# Patient Record
Sex: FEMALE | Race: WHITE | Employment: FULL TIME | URBAN - METROPOLITAN AREA
[De-identification: names, ages, dates, MRNs, and addresses within clinical notes are randomized per-mention and may not be internally consistent; named-entity substitution may affect disease eponyms.]

---

## 2018-05-07 ENCOUNTER — HOSPITAL ENCOUNTER (OUTPATIENT)
Dept: OTHER | Age: 28
Discharge: HOME | End: 2018-05-07
Attending: NURSE PRACTITIONER | Admitting: NURSE PRACTITIONER

## 2018-05-07 DIAGNOSIS — M54.5 LOW BACK PAIN, UNSPECIFIED BACK PAIN LATERALITY, UNSPECIFIED CHRONICITY, WITH SCIATICA PRESENCE UNSPECIFIED: ICD-10-CM

## 2018-08-13 ENCOUNTER — HOSPITAL ENCOUNTER (OUTPATIENT)
Dept: WOMENS IMAGING | Age: 28
Discharge: HOME | End: 2018-08-13
Payer: COMMERCIAL

## 2018-08-13 DIAGNOSIS — N64.89 BREAST ASYMMETRY: ICD-10-CM

## 2018-08-13 PROCEDURE — G0279 TOMOSYNTHESIS, MAMMO: HCPCS

## 2018-08-21 ENCOUNTER — HOSPITAL ENCOUNTER (OUTPATIENT)
Age: 28
Setting detail: OUTPATIENT SURGERY
Discharge: HOME | End: 2018-08-21
Attending: INTERNAL MEDICINE | Admitting: INTERNAL MEDICINE
Payer: COMMERCIAL

## 2018-08-21 VITALS
OXYGEN SATURATION: 98 % | SYSTOLIC BLOOD PRESSURE: 95 MMHG | DIASTOLIC BLOOD PRESSURE: 46 MMHG | BODY MASS INDEX: 28.32 KG/M2 | HEIGHT: 65 IN | TEMPERATURE: 98.1 F | WEIGHT: 170 LBS | HEART RATE: 68 BPM | RESPIRATION RATE: 18 BRPM

## 2018-08-21 LAB — PREGNANCY, URINE: NEGATIVE

## 2018-08-21 PROCEDURE — 2709999900 HC NON-CHARGEABLE SUPPLY: Performed by: INTERNAL MEDICINE

## 2018-08-21 PROCEDURE — 3609027000 HC COLONOSCOPY: Performed by: INTERNAL MEDICINE

## 2018-08-21 PROCEDURE — 99153 MOD SED SAME PHYS/QHP EA: CPT | Performed by: INTERNAL MEDICINE

## 2018-08-21 PROCEDURE — 84703 CHORIONIC GONADOTROPIN ASSAY: CPT

## 2018-08-21 PROCEDURE — 7100000010 HC PHASE II RECOVERY - FIRST 15 MIN: Performed by: INTERNAL MEDICINE

## 2018-08-21 PROCEDURE — 6360000002 HC RX W HCPCS: Performed by: INTERNAL MEDICINE

## 2018-08-21 PROCEDURE — 2580000003 HC RX 258: Performed by: INTERNAL MEDICINE

## 2018-08-21 PROCEDURE — 7100000011 HC PHASE II RECOVERY - ADDTL 15 MIN: Performed by: INTERNAL MEDICINE

## 2018-08-21 PROCEDURE — 99152 MOD SED SAME PHYS/QHP 5/>YRS: CPT | Performed by: INTERNAL MEDICINE

## 2018-08-21 RX ORDER — MIDAZOLAM HYDROCHLORIDE 5 MG/ML
INJECTION INTRAMUSCULAR; INTRAVENOUS PRN
Status: DISCONTINUED | OUTPATIENT
Start: 2018-08-21 | End: 2018-08-21 | Stop reason: HOSPADM

## 2018-08-21 RX ORDER — SODIUM CHLORIDE, SODIUM LACTATE, POTASSIUM CHLORIDE, CALCIUM CHLORIDE 600; 310; 30; 20 MG/100ML; MG/100ML; MG/100ML; MG/100ML
INJECTION, SOLUTION INTRAVENOUS CONTINUOUS
Status: DISCONTINUED | OUTPATIENT
Start: 2018-08-21 | End: 2018-08-21 | Stop reason: HOSPADM

## 2018-08-21 RX ORDER — LIDOCAINE HYDROCHLORIDE 10 MG/ML
0.1 INJECTION, SOLUTION EPIDURAL; INFILTRATION; INTRACAUDAL; PERINEURAL ONCE
Status: DISCONTINUED | OUTPATIENT
Start: 2018-08-21 | End: 2018-08-21 | Stop reason: HOSPADM

## 2018-08-21 RX ORDER — FENTANYL CITRATE 50 UG/ML
INJECTION, SOLUTION INTRAMUSCULAR; INTRAVENOUS PRN
Status: DISCONTINUED | OUTPATIENT
Start: 2018-08-21 | End: 2018-08-21 | Stop reason: HOSPADM

## 2018-08-21 RX ADMIN — SODIUM CHLORIDE, POTASSIUM CHLORIDE, SODIUM LACTATE AND CALCIUM CHLORIDE: 600; 310; 30; 20 INJECTION, SOLUTION INTRAVENOUS at 13:21

## 2018-08-21 ASSESSMENT — PAIN SCALES - GENERAL
PAINLEVEL_OUTOF10: 0

## 2018-08-21 ASSESSMENT — PAIN - FUNCTIONAL ASSESSMENT: PAIN_FUNCTIONAL_ASSESSMENT: 0-10

## 2018-08-21 NOTE — H&P
History and Physical / Pre-Sedation Assessment    Patient:  Kuldip Maldonado   :   1990     Intended Procedure: colon     HPI: constipation    Nurses notes reviewed and agreed. Medications reviewed  Allergies: No Known Allergies    Physical Exam:  Vital Signs: /62   Pulse 71   Temp 98.1 °F (36.7 °C) (Temporal)   Resp 16   Ht 5' 5\" (1.651 m)   Wt 170 lb (77.1 kg)   LMP 2018   SpO2 98%   BMI 28.29 kg/m²  Body mass index is 28.29 kg/m². Airway:Normal  Pulmonary:Normal  Cardiac:Normal  Abdomen:Normal    Pre-Procedure Assessment / Plan:  ASA 2 - Patient with mild systemic disease with no functional limitations  Level of Sedation Plan: Moderate  sedation  Post Procedure plan: Return to same level of care    I assessed the patient and find that the patient is in satisfactory condition to proceed with the planned procedure and sedation plan. I have explained the risk, benefits, and alternatives to the procedure. The patient understands and agrees to proceed.   Yes    Patti Ortiz  1:50 PM 2018

## 2018-08-22 NOTE — OP NOTE
[facility], [place]                   JonathanJaspreet lea                                  OPERATIVE REPORT    PATIENT NAME: Curry Mascorro                   :        1990  MED REC NO:   [id number]                          ROOM:  ACCOUNT NO:   [de-identified]                           ADMIT DATE: 2018  PROVIDER:     Rodrigo Schwartz MD    DATE OF PROCEDURE:  2018    PREPROCEDURE DIAGNOSIS:  Constipation. POSTPROCEDURE DIAGNOSIS:  Normal.    INDICATIONS:  The patient is a 64-year-old female who has severe  constipation, states she can have a bowel movement twice a month, has not  responded to conservative over-the-counter measures. Consent was obtained. OPERATIVE PROCEDURE:  The patient was sedated with 100 mcg of fentanyl, 8  mg of Versed IV. She underwent continuous blood pressure, oximetry and  cardiac monitoring. The Olympus video colonoscope was inserted, passed to  the tip of the cecum. Cecal landmarks were identified and photographed. Mucosa was examined in a circular fashion upon withdrawal of the scope. Prep was good. Cecum, ascending, transverse, descending and sigmoid colons  were all normal without evidence of inflammation, ulceration or mass  lesion. Rectum was visualized both on antegrade and retrograde views, was  normal.  She tolerated the procedure well. IMPRESSION:  Normal colonoscopy. She has been instructed on a high-fiber  diet. We will see her in the office and try one of the new medications for  constipation, i.e., either Linzess or Trulance.         James Salazar MD    D: 2018 14:20:41       T: 2018 19:46:30     [id number]  Job#: [id number]     Doc#: [id number]    CC:  Miracle Goodrich NP

## 2021-08-22 ENCOUNTER — APPOINTMENT (OUTPATIENT)
Dept: CT IMAGING | Age: 31
End: 2021-08-22
Payer: COMMERCIAL

## 2021-08-22 ENCOUNTER — APPOINTMENT (OUTPATIENT)
Dept: GENERAL RADIOLOGY | Age: 31
End: 2021-08-22
Payer: COMMERCIAL

## 2021-08-22 ENCOUNTER — HOSPITAL ENCOUNTER (EMERGENCY)
Age: 31
Discharge: HOME | End: 2021-08-22
Attending: EMERGENCY MEDICINE
Payer: COMMERCIAL

## 2021-08-22 VITALS
BODY MASS INDEX: 26.66 KG/M2 | WEIGHT: 160 LBS | HEART RATE: 90 BPM | RESPIRATION RATE: 16 BRPM | DIASTOLIC BLOOD PRESSURE: 64 MMHG | HEIGHT: 65 IN | SYSTOLIC BLOOD PRESSURE: 101 MMHG | OXYGEN SATURATION: 100 % | TEMPERATURE: 98.5 F

## 2021-08-22 DIAGNOSIS — K59.00 CONSTIPATION, UNSPECIFIED CONSTIPATION TYPE: ICD-10-CM

## 2021-08-22 DIAGNOSIS — U07.1 COVID-19: Primary | ICD-10-CM

## 2021-08-22 DIAGNOSIS — N30.01 ACUTE CYSTITIS WITH HEMATURIA: ICD-10-CM

## 2021-08-22 LAB
A/G RATIO: 1.1 (ref 1.1–2.2)
ALBUMIN SERPL-MCNC: 4.2 G/DL (ref 3.4–5)
ALP BLD-CCNC: 79 U/L (ref 40–129)
ALT SERPL-CCNC: 51 U/L (ref 10–40)
ANION GAP SERPL CALCULATED.3IONS-SCNC: 11 MMOL/L (ref 3–16)
AST SERPL-CCNC: 54 U/L (ref 15–37)
BACTERIA: ABNORMAL /HPF
BASOPHILS ABSOLUTE: 0 K/UL (ref 0–0.2)
BASOPHILS RELATIVE PERCENT: 0.3 %
BILIRUB SERPL-MCNC: 0.4 MG/DL (ref 0–1)
BILIRUBIN URINE: NEGATIVE
BLOOD, URINE: ABNORMAL
BUN BLDV-MCNC: 6 MG/DL (ref 7–20)
CALCIUM SERPL-MCNC: 9.4 MG/DL (ref 8.3–10.6)
CHLORIDE BLD-SCNC: 99 MMOL/L (ref 99–110)
CLARITY: CLEAR
CO2: 26 MMOL/L (ref 21–32)
COLOR: YELLOW
CREAT SERPL-MCNC: 0.7 MG/DL (ref 0.6–1.1)
D DIMER: <200 NG/ML DDU (ref 0–229)
EOSINOPHILS ABSOLUTE: 0 K/UL (ref 0–0.6)
EOSINOPHILS RELATIVE PERCENT: 0.1 %
EPITHELIAL CELLS, UA: ABNORMAL /HPF (ref 0–5)
GFR AFRICAN AMERICAN: >60
GFR NON-AFRICAN AMERICAN: >60
GLOBULIN: 3.8 G/DL
GLUCOSE BLD-MCNC: 94 MG/DL (ref 70–99)
GLUCOSE URINE: NEGATIVE MG/DL
HCG QUALITATIVE: NEGATIVE
HCT VFR BLD CALC: 37.9 % (ref 36–48)
HEMOGLOBIN: 13.2 G/DL (ref 12–16)
KETONES, URINE: 15 MG/DL
LEUKOCYTE ESTERASE, URINE: ABNORMAL
LIPASE: 28 U/L (ref 13–60)
LYMPHOCYTES ABSOLUTE: 1 K/UL (ref 1–5.1)
LYMPHOCYTES RELATIVE PERCENT: 31.2 %
MCH RBC QN AUTO: 30.7 PG (ref 26–34)
MCHC RBC AUTO-ENTMCNC: 34.9 G/DL (ref 31–36)
MCV RBC AUTO: 87.9 FL (ref 80–100)
MICROSCOPIC EXAMINATION: YES
MONOCYTES ABSOLUTE: 0.5 K/UL (ref 0–1.3)
MONOCYTES RELATIVE PERCENT: 14.5 %
NEUTROPHILS ABSOLUTE: 1.8 K/UL (ref 1.7–7.7)
NEUTROPHILS RELATIVE PERCENT: 53.9 %
NITRITE, URINE: NEGATIVE
PDW BLD-RTO: 13.3 % (ref 12.4–15.4)
PH UA: 5.5 (ref 5–8)
PLATELET # BLD: 160 K/UL (ref 135–450)
PMV BLD AUTO: 9.2 FL (ref 5–10.5)
POTASSIUM REFLEX MAGNESIUM: 3.6 MMOL/L (ref 3.5–5.1)
PROTEIN UA: NEGATIVE MG/DL
RBC # BLD: 4.32 M/UL (ref 4–5.2)
RBC UA: ABNORMAL /HPF (ref 0–4)
SARS-COV-2, NAAT: DETECTED
SODIUM BLD-SCNC: 136 MMOL/L (ref 136–145)
SPECIFIC GRAVITY UA: <=1.005 (ref 1–1.03)
TOTAL PROTEIN: 8 G/DL (ref 6.4–8.2)
URINE TYPE: ABNORMAL
UROBILINOGEN, URINE: 0.2 E.U./DL
WBC # BLD: 3.3 K/UL (ref 4–11)
WBC UA: ABNORMAL /HPF (ref 0–5)

## 2021-08-22 PROCEDURE — 6360000002 HC RX W HCPCS: Performed by: EMERGENCY MEDICINE

## 2021-08-22 PROCEDURE — 96374 THER/PROPH/DIAG INJ IV PUSH: CPT

## 2021-08-22 PROCEDURE — 85379 FIBRIN DEGRADATION QUANT: CPT

## 2021-08-22 PROCEDURE — 6360000004 HC RX CONTRAST MEDICATION: Performed by: EMERGENCY MEDICINE

## 2021-08-22 PROCEDURE — 83690 ASSAY OF LIPASE: CPT

## 2021-08-22 PROCEDURE — 96375 TX/PRO/DX INJ NEW DRUG ADDON: CPT

## 2021-08-22 PROCEDURE — 94640 AIRWAY INHALATION TREATMENT: CPT

## 2021-08-22 PROCEDURE — 93005 ELECTROCARDIOGRAM TRACING: CPT | Performed by: EMERGENCY MEDICINE

## 2021-08-22 PROCEDURE — 80053 COMPREHEN METABOLIC PANEL: CPT

## 2021-08-22 PROCEDURE — 84703 CHORIONIC GONADOTROPIN ASSAY: CPT

## 2021-08-22 PROCEDURE — 87086 URINE CULTURE/COLONY COUNT: CPT

## 2021-08-22 PROCEDURE — 6370000000 HC RX 637 (ALT 250 FOR IP): Performed by: EMERGENCY MEDICINE

## 2021-08-22 PROCEDURE — 74177 CT ABD & PELVIS W/CONTRAST: CPT

## 2021-08-22 PROCEDURE — 2580000003 HC RX 258: Performed by: PHYSICIAN ASSISTANT

## 2021-08-22 PROCEDURE — 81001 URINALYSIS AUTO W/SCOPE: CPT

## 2021-08-22 PROCEDURE — 85025 COMPLETE CBC W/AUTO DIFF WBC: CPT

## 2021-08-22 PROCEDURE — 99284 EMERGENCY DEPT VISIT MOD MDM: CPT

## 2021-08-22 PROCEDURE — 87635 SARS-COV-2 COVID-19 AMP PRB: CPT

## 2021-08-22 PROCEDURE — 36415 COLL VENOUS BLD VENIPUNCTURE: CPT

## 2021-08-22 PROCEDURE — 71045 X-RAY EXAM CHEST 1 VIEW: CPT

## 2021-08-22 RX ORDER — NITROFURANTOIN 25; 75 MG/1; MG/1
100 CAPSULE ORAL 2 TIMES DAILY
Qty: 10 CAPSULE | Refills: 0 | Status: SHIPPED | OUTPATIENT
Start: 2021-08-22 | End: 2021-08-27

## 2021-08-22 RX ORDER — ALBUTEROL SULFATE 90 UG/1
2 AEROSOL, METERED RESPIRATORY (INHALATION) ONCE
Status: COMPLETED | OUTPATIENT
Start: 2021-08-22 | End: 2021-08-22

## 2021-08-22 RX ORDER — 0.9 % SODIUM CHLORIDE 0.9 %
1000 INTRAVENOUS SOLUTION INTRAVENOUS ONCE
Status: COMPLETED | OUTPATIENT
Start: 2021-08-22 | End: 2021-08-22

## 2021-08-22 RX ORDER — METOCLOPRAMIDE HYDROCHLORIDE 5 MG/ML
10 INJECTION INTRAMUSCULAR; INTRAVENOUS ONCE
Status: COMPLETED | OUTPATIENT
Start: 2021-08-22 | End: 2021-08-22

## 2021-08-22 RX ORDER — DIPHENHYDRAMINE HYDROCHLORIDE 50 MG/ML
12.5 INJECTION INTRAMUSCULAR; INTRAVENOUS ONCE
Status: COMPLETED | OUTPATIENT
Start: 2021-08-22 | End: 2021-08-22

## 2021-08-22 RX ADMIN — DIPHENHYDRAMINE HYDROCHLORIDE 12.5 MG: 50 INJECTION, SOLUTION INTRAMUSCULAR; INTRAVENOUS at 17:04

## 2021-08-22 RX ADMIN — SODIUM CHLORIDE 1000 ML: 9 INJECTION, SOLUTION INTRAVENOUS at 15:59

## 2021-08-22 RX ADMIN — METOCLOPRAMIDE 10 MG: 5 INJECTION, SOLUTION INTRAMUSCULAR; INTRAVENOUS at 17:05

## 2021-08-22 RX ADMIN — IOPAMIDOL 75 ML: 755 INJECTION, SOLUTION INTRAVENOUS at 19:04

## 2021-08-22 RX ADMIN — Medication 2 PUFF: at 19:45

## 2021-08-22 ASSESSMENT — ENCOUNTER SYMPTOMS
SHORTNESS OF BREATH: 1
VOMITING: 1
CHEST TIGHTNESS: 0
NAUSEA: 1
DIARRHEA: 0
ABDOMINAL PAIN: 1
CONSTIPATION: 1
COUGH: 1
BACK PAIN: 0

## 2021-08-22 ASSESSMENT — PAIN SCALES - GENERAL: PAINLEVEL_OUTOF10: 10

## 2021-08-22 ASSESSMENT — PAIN DESCRIPTION - PAIN TYPE: TYPE: ACUTE PAIN

## 2021-08-22 ASSESSMENT — PAIN DESCRIPTION - LOCATION: LOCATION: ABDOMEN

## 2021-08-22 NOTE — ED PROVIDER NOTES
I independently performed a history and physical on Aziza. All diagnostic, treatment, and disposition decisions were made by myself in conjunction with the advanced practice provider. For further details of Roula Echavarria emergency department encounter, please see VIDHYA Dejesus's documentation. Patient is a 78-year-old female who just got back from [place] and ultimately started developing over the last few days fever along with cough and some abdominal discomfort. She reports having no bowel movement in the last 3 weeks although normally only goes seldomly but this is worse than normal.  She denies any vomiting. No current chest pain. She does have a history of smoking but quit. Due to concerning symptoms, she came to the ED for further assessment. Physical:   Gen: No acute distress. AOx3. Psych: Normal mood and affect  HEENT: NCAT  Neck: supple, normal ROM, NTTP, no nuchal rigidity   Cardiac: RRR, pulses 2+ in upper extremities  Lungs: C2AB, no R/R/W  Abdomen: soft and nontender with no R/D/G  Neuro: no focal neuro deficits with strength and sensation 5/5 in all 4 extremities    The Ekg interpreted by me shows  normal sinus rhythm with a rate of 87  Axis is   Normal  QTc is  normal  Intervals and Durations are unremarkable. ST Segments: normal  No significant change from prior EKG dated - no old EKG  No STEMI       MDM: Patient was evaluated due to worsening cough with fever over the last couple of days but also due to concern for not having a bowel movement for reportedly 3 weeks. She had a very benign abdominal exam but based on her story, we did order a CT which was negative for any bowel obstruction or significant pathology. It did mention appearance of Covid like pneumonia and her Covid test to come back positive. Her oxygenation was normal in the ED. She had no respiratory distress or concerns at this point.   She is aware to isolate for 10 days from onset of symptoms and make sure symptoms are improving. She was not vaccinated. She is aware that if she has any worsening fever associated with chest pain along with shortness of breath, vomiting, severe abdominal pain, then return to the ED for repeat assessment, even if that means over the next 12 hours, but otherwise follow-up with primary doctor over the next few days. She was well-appearing and in no acute distress at time of discharge and felt comfortable with this plan. Urinalysis showed possible UTI and therefore she was started on Macrobid. We gave her magnesium citrate to see if this can help her have a bowel movement at home. D-dimer was negative and story not suggestive of pulmonary embolism.      Ky Henry MD  08/23/21 [id number]

## 2021-08-22 NOTE — ED PROVIDER NOTES
**ADVANCED PRACTICE PROVIDER, I HAVE EVALUATED THIS [facility]  ED  EMERGENCY DEPARTMENT ENCOUNTER      Pt Name: Kendra Patterson  LSN:[id number]  Birthdate 1990  Date of evaluation: 8/22/2021  Provider: VIDHYA Fontenot      Chief Complaint:    Chief Complaint   Patient presents with    Fever     Fever, cough, emesis since Wednesday    Cough    Abdominal Pain     Reports no BM x3 weeks. Nursing Notes, Past Medical Hx, Past Surgical Hx, Social Hx, Allergies, and Family Hx were all reviewed and agreed with or any disagreements were addressed in the HPI.    HPI: (Location, Duration, Timing, Severity, Quality, Assoc Sx, Context, Modifying factors)    Chief Complaint of fever, cough, abdominal pain. This is a  28 y.o. female who presents to the emergency department today complaining of fever, cough, nausea, vomiting, abdominal pain since Tuesday. The patient states she returned from a trip to [place] yesterday and became symptomatic beginning on Tuesday 8/17. She states she has not taken her temperature at home but her fever has been so high that she has been she does report productive cough with yellow-green mucus. She states she also lost her sense of taste and smell therefore has not had an appetite. She was nauseous last night and this morning and has had several bouts of vomiting. She did not get the Covid vaccine. She denies knowingly being around anybody with Covid. She states she also has abdominal pain rating it as a 10/10 and has not had a bowel movement in 3 weeks. She states this is a chronic problem for her and she typically only has 3-4 bowel movements each month. She has previously had a colonoscopy done which showed no abnormalities. She has taken Linzess for constipation without significant relief. She does admit to shortness of breath when she is up moving around. She denies any chest pain.   She denies any other known medical conditions. PastMedical/Surgical History:  History reviewed. No pertinent past medical history. Procedure Laterality Date    COLONOSCOPY  08/21/2018    WDL    [place] COLONOSCOPY FLX DX W/COLLJ SPEC WHEN PFRMD N/A 8/21/2018    COLONOSCOPY performed by Adelina Mcgrath MD at [address] [place] [address] Drive EXTRACTION         Medications:  Previous Medications    No medications on file         Review of Systems:  (2-9 systems needed)  Review of Systems   Constitutional: Positive for chills, diaphoresis and fever. HENT: Negative for congestion. Eyes: Negative for visual disturbance. Respiratory: Positive for cough and shortness of breath. Negative for chest tightness. Cardiovascular: Negative for chest pain and palpitations. Gastrointestinal: Positive for abdominal pain, constipation, nausea and vomiting. Negative for diarrhea. Genitourinary: Negative for dysuria, frequency, hematuria, pelvic pain and urgency. Musculoskeletal: Negative for back pain. Skin: Negative for rash. Neurological: Negative for dizziness, weakness and headaches. Physical Exam:  Physical Exam  Vitals and nursing note reviewed. Constitutional:       Appearance: Normal appearance. She is not diaphoretic. HENT:      Head: Normocephalic and atraumatic. Nose: Nose normal.   Eyes:      General:         Right eye: No discharge. Left eye: No discharge. Cardiovascular:      Rate and Rhythm: Normal rate and regular rhythm. Pulses: Normal pulses. Heart sounds: Normal heart sounds. No murmur heard. No friction rub. No gallop. Pulmonary:      Effort: Pulmonary effort is normal. No respiratory distress. Breath sounds: Normal breath sounds. No stridor. No wheezing, rhonchi or rales. Musculoskeletal:         General: Normal range of motion. Cervical back: Normal range of motion and neck supple. Skin:     General: Skin is warm and dry. Coloration: Skin is not pale. Neurological:      Mental Status: She is alert and oriented to person, place, and time.    Psychiatric:         Mood and Affect: Mood normal.         Behavior: Behavior normal.         MEDICAL DECISION MAKING    Vitals:    Vitals:    08/22/21 1505 08/22/21 1606 08/22/21 1844 08/22/21 1950   BP: 100/80 109/69 100/61    Pulse: 90 85 84    Resp: 16 16 16    Temp:   98.5 °F (36.9 °C)    TempSrc:   Oral    SpO2: 100% 100% 100% 100%   Weight:       Height:           LABS:  Labs Reviewed   COVID-19, RAPID - Abnormal; Notable for the following components:       Result Value    SARS-CoV-2, NAAT DETECTED (*)     All other components within normal limits    Narrative:     Enriqueta Cerna tel. [phone],  Microbiology results called to and read back by Iris Ashley RN, 08/22/2021  16:13, by Laura Mcdonald  Performed at:  [address], [facility] [place]   Phone [phone]   CBC WITH AUTO DIFFERENTIAL - Abnormal; Notable for the following components:    WBC 3.3 (*)     All other components within normal limits    Narrative:     Performed at:  [address], [facility] [place]   Phone [phone]   COMPREHENSIVE METABOLIC PANEL W/ REFLEX TO MG FOR LOW K - Abnormal; Notable for the following components:    BUN 6 (*)     ALT 51 (*)     AST 54 (*)     All other components within normal limits    Narrative:     Performed at:  [address], [facility] [place]   Phone [phone]   URINALYSIS - Abnormal; Notable for the following components:    Ketones, Urine 15 (*)     Blood, Urine SMALL (*)     Leukocyte Esterase, Urine LARGE (*)     All other components within normal limits    Narrative:     Performed at:  [address], [address] [place]   Phone [phone]   MICROSCOPIC URINALYSIS - Abnormal; Notable for the following 1844)   metoclopramide (REGLAN) injection 10 mg (10 mg Intravenous Given 8/22/21 1705)   diphenhydrAMINE (BENADRYL) injection 12.5 mg (12.5 mg Intravenous Given 8/22/21 1704)   albuterol sulfate  (90 Base) MCG/ACT inhaler 2 puff (2 puffs Inhalation Given 8/22/21 1945)   iopamidol (ISOVUE-370) 76 % injection 75 mL (75 mLs Intravenous Given 8/22/21 1904)       Patient was seen in the emergency department today for fever, abdominal pain, shortness of breath. Vital signs are notable for mild hypotension, she was given 1 L of IV fluids while in the emergency department. Physical exam is grossly benign. She is tested for Covid and found to be positive. EKG interpreted by attending physician and found to have normal sinus rhythm. Urinalysis with 21-50 white blood cells and 5-10 RBC, she will be treated with macrobid. CBC noted to mild leukopenia at 3.3 white blood cells. No acute anemia. CMP without acute electrolyte abnormality, renal function maintained. We did obtain a D-dimer which was negative. Lipase is 28  Negative pregnancy test.  Chest x-ray without acute cardiopulmonary process. Due to the longevity of the patient's constipation and unable to have a bowel movement for 3 weeks we did obtain a CT abdomen pelvis. No acute concern for acute process in abdomen and pelvis. She is noted to have moderate volume. Lung bases do show consistent changes with COVID-19 or viral pneumonia. Discussion was had with patient regarding all lab and imaging results. She is counseled on symptomatic treatment of COVID-19 symptoms. She will be prescribed Macrobid for urinary tract infection. She is provided a pulse ox upon discharge and given instructions to check her oxygenation 3 times daily and to return to the emergency department for readings less than 90%. I will prescribe one-time dose of mag citrate for constipation.   She does agree to return to the emergency department for new or worsening symptoms. She is also advised to self quarantine for 10 days. The patient tolerated their visit well. I evaluated the patient. The physician was available for consultation as needed. The patient and / or the family were informed of the results of any tests, a time was given to answer questions, a plan was proposed and they agreed with plan.       Results for orders placed or performed during the hospital encounter of 08/22/21   COVID-19, Rapid    Specimen: Nasopharyngeal Swab   Result Value Ref Range    SARS-CoV-2, NAAT DETECTED (AA) Not Detected   CBC auto differential   Result Value Ref Range    WBC 3.3 (L) 4.0 - 11.0 K/uL    RBC 4.32 4.00 - 5.20 M/uL    Hemoglobin 13.2 12.0 - 16.0 g/dL    Hematocrit 37.9 36.0 - 48.0 %    MCV 87.9 80.0 - 100.0 fL    MCH 30.7 26.0 - 34.0 pg    MCHC 34.9 31.0 - 36.0 g/dL    RDW 13.3 12.4 - 15.4 %    Platelets 041 134 - 515 K/uL    MPV 9.2 5.0 - 10.5 fL    Neutrophils % 53.9 %    Lymphocytes % 31.2 %    Monocytes % 14.5 %    Eosinophils % 0.1 %    Basophils % 0.3 %    Neutrophils Absolute 1.8 1.7 - 7.7 K/uL    Lymphocytes Absolute 1.0 1.0 - 5.1 K/uL    Monocytes Absolute 0.5 0.0 - 1.3 K/uL    Eosinophils Absolute 0.0 0.0 - 0.6 K/uL    Basophils Absolute 0.0 0.0 - 0.2 K/uL   Comprehensive Metabolic Panel w/ Reflex to MG   Result Value Ref Range    Sodium 136 136 - 145 mmol/L    Potassium reflex Magnesium 3.6 3.5 - 5.1 mmol/L    Chloride 99 99 - 110 mmol/L    CO2 26 21 - 32 mmol/L    Anion Gap 11 3 - 16    Glucose 94 70 - 99 mg/dL    BUN 6 (L) 7 - 20 mg/dL    CREATININE 0.7 0.6 - 1.1 mg/dL    GFR Non-African American >60 >60    GFR African American >60 >60    Calcium 9.4 8.3 - 10.6 mg/dL    Total Protein 8.0 6.4 - 8.2 g/dL    Albumin 4.2 3.4 - 5.0 g/dL    Albumin/Globulin Ratio 1.1 1.1 - 2.2    Total Bilirubin 0.4 0.0 - 1.0 mg/dL    Alkaline Phosphatase 79 40 - 129 U/L    ALT 51 (H) 10 - 40 U/L    AST 54 (H) 15 - 37 U/L    Globulin 3.8 g/dL   Lipase   Result Value Ref Range Lipase 28.0 13.0 - 60.0 U/L   HCG Qualitative, Serum   Result Value Ref Range    hCG Qual Negative Detects HCG level >10 MIU/mL   Urinalysis, reflex to microscopic   Result Value Ref Range    Color, UA Yellow Straw/Yellow    Clarity, UA Clear Clear    Glucose, Ur Negative Negative mg/dL    Bilirubin Urine Negative Negative    Ketones, Urine 15 (A) Negative mg/dL    Specific Gravity, UA <=1.005 1.005 - 1.030    Blood, Urine SMALL (A) Negative    pH, UA 5.5 5.0 - 8.0    Protein, UA Negative Negative mg/dL    Urobilinogen, Urine 0.2 <2.0 E.U./dL    Nitrite, Urine Negative Negative    Leukocyte Esterase, Urine LARGE (A) Negative    Microscopic Examination YES     Urine Type NotGiven    D-dimer, quantitative   Result Value Ref Range    D-Dimer, Quant <200 0 - 229 ng/mL DDU   Microscopic Urinalysis   Result Value Ref Range    WBC, UA 21-50 (A) 0 - 5 /HPF    RBC, UA 5-10 (A) 0 - 4 /HPF    Epithelial Cells, UA 11-20 (A) 0 - 5 /HPF    Bacteria, UA 3+ (A) None Seen /HPF   EKG 12 Lead   Result Value Ref Range    Ventricular Rate 87 BPM    Atrial Rate 87 BPM    P-R Interval 132 ms    QRS Duration 74 ms    Q-T Interval 364 ms    QTc Calculation (Bazett) 438 ms    P Axis 34 degrees    R Axis 22 degrees    T Axis 34 degrees    Diagnosis       Normal sinus rhythmNormal ECGNo previous ECGs available         I estimate there is LOW risk for EPIGLOTTITIS, PNEUMONIA, MENINGITIS, OR URINARY TRACT INFECTION, thus I consider the discharge disposition reasonable. Also, there is no evidence or peritonitis, sepsis, or toxicity. Kirstie Olmos and I have discussed the diagnosis and risks, and we agree with discharging home to follow-up with their primary doctor. We also discussed returning to the Emergency Department immediately if new or worsening symptoms occur.  We have discussed the symptoms which are most concerning (e.g., changing or worsening pain, trouble swallowing or breating, neck stiffness, fever) that necessitate immediate return. Final Impression    1. COVID-19    2. Constipation, unspecified constipation type    3. Acute cystitis with hematuria        Discharge Vital Signs:  Blood pressure 101/64, pulse 90, temperature 98.5 °F (36.9 °C), temperature source Oral, resp. rate 16, height 5' 5\" (1.651 m), weight 160 lb (72.6 kg), last menstrual period 08/04/2021, SpO2 100 %. CLINICAL IMPRESSION:  1. COVID-19    2. Constipation, unspecified constipation type    3.  Acute cystitis with hematuria        DISPOSITION        PATIENT REFERRED TO:  Trina Friasjustice  [address] [address]  [phone]    Call in 2 days      [facility]  ED  [facility] [address] [phone]  Go to   If symptoms worsen      DISCHARGE MEDICATIONS:  New Prescriptions    MAGNESIUM CITRATE SOLUTION    Take 296 mLs by mouth once for 1 dose    NITROFURANTOIN, MACROCRYSTAL-MONOHYDRATE, (MACROBID) 100 MG CAPSULE    Take 1 capsule by mouth 2 times daily for 5 days       DISCONTINUED MEDICATIONS:  Discontinued Medications    No medications on file              (Please note the MDM and HPI sections of this note were completed with a voice recognition program.  Efforts were made to edit the dictations but occasionally words are mis-transcribed.)    Electronically signed, VIDHYA Pierre,           Monika Pierre  08/25/21 2241

## 2021-08-23 ENCOUNTER — CARE COORDINATION (OUTPATIENT)
Dept: CARE COORDINATION | Age: 31
End: 2021-08-23

## 2021-08-23 LAB — URINE CULTURE, ROUTINE: NORMAL

## 2021-08-23 NOTE — ED NOTES
Removed PIV, reviewed discharge paperwork including follow up appointments and prescriptions. Pt verbalized understanding.        Leopoldo Cota, RN  08/22/21 2016

## 2021-08-23 NOTE — CARE COORDINATION
Left basic callback request, using only first names, requesting return callback. Provided & repeated direct callback number to reach this writer. As the outgoing vm identifies as vm for \"Miriam\", ACM further advised to kindly disregard this message if this is no longer the contact number to reach \"Carri\". As this is the number on record, and in absence of HIPAA to reference for alternate contact to outreach -  Will await return callback from pt.

## 2021-08-24 LAB
EKG ATRIAL RATE: 87 BPM
EKG DIAGNOSIS: NORMAL
EKG P AXIS: 34 DEGREES
EKG P-R INTERVAL: 132 MS
EKG Q-T INTERVAL: 364 MS
EKG QRS DURATION: 74 MS
EKG QTC CALCULATION (BAZETT): 438 MS
EKG R AXIS: 22 DEGREES
EKG T AXIS: 34 DEGREES
EKG VENTRICULAR RATE: 87 BPM

## 2021-08-24 PROCEDURE — 93010 ELECTROCARDIOGRAM REPORT: CPT | Performed by: INTERNAL MEDICINE

## 2024-10-06 ENCOUNTER — APPOINTMENT (OUTPATIENT)
Dept: ULTRASOUND IMAGING | Age: 34
End: 2024-10-06
Payer: COMMERCIAL

## 2024-10-06 ENCOUNTER — HOSPITAL ENCOUNTER (EMERGENCY)
Age: 34
Discharge: HOME | End: 2024-10-06
Payer: COMMERCIAL

## 2024-10-06 VITALS
DIASTOLIC BLOOD PRESSURE: 69 MMHG | OXYGEN SATURATION: 100 % | RESPIRATION RATE: 18 BRPM | WEIGHT: 140 LBS | HEART RATE: 69 BPM | SYSTOLIC BLOOD PRESSURE: 102 MMHG | BODY MASS INDEX: 23.9 KG/M2 | HEIGHT: 64 IN | TEMPERATURE: 97.7 F

## 2024-10-06 DIAGNOSIS — R10.9 ABDOMINAL PAIN DURING PREGNANCY IN FIRST TRIMESTER: Primary | ICD-10-CM

## 2024-10-06 DIAGNOSIS — O26.891 ABDOMINAL PAIN DURING PREGNANCY IN FIRST TRIMESTER: Primary | ICD-10-CM

## 2024-10-06 DIAGNOSIS — O21.9 NAUSEA AND VOMITING IN PREGNANCY: ICD-10-CM

## 2024-10-06 LAB
ALBUMIN SERPL-MCNC: 4.2 G/DL (ref 3.4–5)
ALBUMIN/GLOB SERPL: 1.2 {RATIO} (ref 1.1–2.2)
ALP SERPL-CCNC: 59 U/L (ref 40–129)
ALT SERPL-CCNC: 10 U/L (ref 10–40)
ANION GAP SERPL CALCULATED.3IONS-SCNC: 9 MMOL/L (ref 3–16)
AST SERPL-CCNC: 15 U/L (ref 15–37)
B-HCG SERPL EIA 3RD IS-ACNC: NORMAL MIU/ML
BASOPHILS # BLD: 0 K/UL (ref 0–0.2)
BASOPHILS NFR BLD: 0.3 %
BILIRUB SERPL-MCNC: 0.7 MG/DL (ref 0–1)
BILIRUB UR QL STRIP.AUTO: NEGATIVE
BUN SERPL-MCNC: 7 MG/DL (ref 7–20)
CALCIUM SERPL-MCNC: 8.9 MG/DL (ref 8.3–10.6)
CHLORIDE SERPL-SCNC: 100 MMOL/L (ref 99–110)
CLARITY UR: CLEAR
CO2 SERPL-SCNC: 24 MMOL/L (ref 21–32)
COLOR UR: YELLOW
CREAT SERPL-MCNC: <0.5 MG/DL (ref 0.6–1.1)
DEPRECATED RDW RBC AUTO: 14.1 % (ref 12.4–15.4)
EOSINOPHIL # BLD: 0 K/UL (ref 0–0.6)
EOSINOPHIL NFR BLD: 0.5 %
EPI CELLS #/AREA URNS HPF: ABNORMAL /HPF (ref 0–5)
FLUAV RNA RESP QL NAA+PROBE: NOT DETECTED
FLUBV RNA RESP QL NAA+PROBE: NOT DETECTED
GFR SERPLBLD CREATININE-BSD FMLA CKD-EPI: >90 ML/MIN/{1.73_M2}
GLUCOSE SERPL-MCNC: 108 MG/DL (ref 70–99)
GLUCOSE UR STRIP.AUTO-MCNC: NEGATIVE MG/DL
HCG SERPL QL: POSITIVE
HCT VFR BLD AUTO: 35.2 % (ref 36–48)
HGB BLD-MCNC: 11.9 G/DL (ref 12–16)
HGB UR QL STRIP.AUTO: NEGATIVE
KETONES UR STRIP.AUTO-MCNC: NEGATIVE MG/DL
LEUKOCYTE ESTERASE UR QL STRIP.AUTO: ABNORMAL
LIPASE SERPL-CCNC: 20 U/L (ref 13–60)
LYMPHOCYTES # BLD: 0.6 K/UL (ref 1–5.1)
LYMPHOCYTES NFR BLD: 12.3 %
MCH RBC QN AUTO: 30.1 PG (ref 26–34)
MCHC RBC AUTO-ENTMCNC: 33.8 G/DL (ref 31–36)
MCV RBC AUTO: 89 FL (ref 80–100)
MONOCYTES # BLD: 0.4 K/UL (ref 0–1.3)
MONOCYTES NFR BLD: 7.6 %
MUCOUS THREADS #/AREA URNS LPF: ABNORMAL /LPF
NEUTROPHILS # BLD: 3.8 K/UL (ref 1.7–7.7)
NEUTROPHILS NFR BLD: 79.3 %
NITRITE UR QL STRIP.AUTO: NEGATIVE
PH UR STRIP.AUTO: 8 [PH] (ref 5–8)
PLATELET # BLD AUTO: 191 K/UL (ref 135–450)
PMV BLD AUTO: 9.8 FL (ref 5–10.5)
POTASSIUM SERPL-SCNC: 4 MMOL/L (ref 3.5–5.1)
PROT SERPL-MCNC: 7.7 G/DL (ref 6.4–8.2)
PROT UR STRIP.AUTO-MCNC: ABNORMAL MG/DL
RBC # BLD AUTO: 3.95 M/UL (ref 4–5.2)
RBC #/AREA URNS HPF: ABNORMAL /HPF (ref 0–4)
SARS-COV-2 RNA RESP QL NAA+PROBE: NOT DETECTED
SODIUM SERPL-SCNC: 133 MMOL/L (ref 136–145)
SP GR UR STRIP.AUTO: 1.01 (ref 1–1.03)
UA COMPLETE W REFLEX CULTURE PNL UR: ABNORMAL
UA DIPSTICK W REFLEX MICRO PNL UR: YES
URN SPEC COLLECT METH UR: ABNORMAL
UROBILINOGEN UR STRIP-ACNC: 1 E.U./DL
WBC # BLD AUTO: 4.8 K/UL (ref 4–11)
WBC #/AREA URNS HPF: ABNORMAL /HPF (ref 0–5)

## 2024-10-06 PROCEDURE — 84703 CHORIONIC GONADOTROPIN ASSAY: CPT

## 2024-10-06 PROCEDURE — 84702 CHORIONIC GONADOTROPIN TEST: CPT

## 2024-10-06 PROCEDURE — 6360000002 HC RX W HCPCS

## 2024-10-06 PROCEDURE — 85025 COMPLETE CBC W/AUTO DIFF WBC: CPT

## 2024-10-06 PROCEDURE — 99284 EMERGENCY DEPT VISIT MOD MDM: CPT

## 2024-10-06 PROCEDURE — 83690 ASSAY OF LIPASE: CPT

## 2024-10-06 PROCEDURE — 81001 URINALYSIS AUTO W/SCOPE: CPT

## 2024-10-06 PROCEDURE — 96374 THER/PROPH/DIAG INJ IV PUSH: CPT

## 2024-10-06 PROCEDURE — 80053 COMPREHEN METABOLIC PANEL: CPT

## 2024-10-06 PROCEDURE — 36415 COLL VENOUS BLD VENIPUNCTURE: CPT

## 2024-10-06 PROCEDURE — 2580000003 HC RX 258

## 2024-10-06 PROCEDURE — 6370000000 HC RX 637 (ALT 250 FOR IP)

## 2024-10-06 PROCEDURE — 76801 OB US < 14 WKS SINGLE FETUS: CPT

## 2024-10-06 PROCEDURE — 87636 SARSCOV2 & INF A&B AMP PRB: CPT

## 2024-10-06 RX ORDER — ACETAMINOPHEN 500 MG
1000 TABLET ORAL ONCE
Status: COMPLETED | OUTPATIENT
Start: 2024-10-06 | End: 2024-10-06

## 2024-10-06 RX ORDER — 0.9 % SODIUM CHLORIDE 0.9 %
1000 INTRAVENOUS SOLUTION INTRAVENOUS ONCE
Status: COMPLETED | OUTPATIENT
Start: 2024-10-06 | End: 2024-10-06

## 2024-10-06 RX ORDER — LANOLIN ALCOHOL/MO/W.PET/CERES
50 CREAM (GRAM) TOPICAL DAILY
Qty: 30 TABLET | Refills: 3 | Status: SHIPPED | OUTPATIENT
Start: 2024-10-06

## 2024-10-06 RX ORDER — ONDANSETRON 4 MG/1
4 TABLET, ORALLY DISINTEGRATING ORAL 3 TIMES DAILY PRN
Qty: 21 TABLET | Refills: 0 | Status: SHIPPED | OUTPATIENT
Start: 2024-10-06

## 2024-10-06 RX ORDER — ONDANSETRON 2 MG/ML
4 INJECTION INTRAMUSCULAR; INTRAVENOUS EVERY 6 HOURS PRN
Status: DISCONTINUED | OUTPATIENT
Start: 2024-10-06 | End: 2024-10-06 | Stop reason: HOSPADM

## 2024-10-06 RX ADMIN — SODIUM CHLORIDE 1000 ML: 9 INJECTION, SOLUTION INTRAVENOUS at 10:20

## 2024-10-06 RX ADMIN — ONDANSETRON 4 MG: 2 INJECTION INTRAMUSCULAR; INTRAVENOUS at 10:20

## 2024-10-06 RX ADMIN — ACETAMINOPHEN 1000 MG: 500 TABLET ORAL at 10:21

## 2024-10-06 NOTE — ED TRIAGE NOTES
Patient presents to the ED from home with c/o emesis during pregnancy. Patient reports vomiting since 1430 yesterday. Patient reporting feeling weak, diarrhea, abdominal pain. Denies fever. Patient is about 10 weeks pregnant, has not been established by OB.

## 2024-10-06 NOTE — DISCHARGE INSTRUCTIONS
Blood work is all reassuring.    Ultrasound shows confirmed intrauterine pregnancy:  Estimated gestational age by current ultrasound: 7 weeks and 3 days.     Estimated gestational age by LMP/prior ultrasound: 7 weeks and 4 days.     Estimated Due Date: By ultrasound criteria 05/22/2025    Your viral testing is negative for COVID-19, influenza A, influenza B.    I did send B6 and Unisom as well as Zofran to your pharmacy for management of nausea and early pregnancy.

## 2024-10-06 NOTE — ED NOTES
Discharge instructions reviewed with patient and she verbalizes understanding. PIV removed without assistance. Patient ambulatory out of the ED without assistance.

## 2024-10-06 NOTE — ED PROVIDER NOTES
Movements: Extraocular movements intact.      Right eye: Normal extraocular motion and no nystagmus.      Left eye: Normal extraocular motion and no nystagmus.   Cardiovascular:      Rate and Rhythm: Normal rate and regular rhythm.   Pulmonary:      Effort: Pulmonary effort is normal. No tachypnea, bradypnea, accessory muscle usage, prolonged expiration, respiratory distress or retractions.   Neurological:      General: No focal deficit present.      Mental Status: She is alert, oriented to person, place, and time and easily aroused.      Sensory: Sensation is intact.      Motor: Motor function is intact.      Coordination: Coordination is intact.      Gait: Gait is intact. Gait normal.   Psychiatric:         Behavior: Behavior is cooperative.           DIAGNOSTIC RESULTS   LABS:    Labs Reviewed   CBC WITH AUTO DIFFERENTIAL - Abnormal; Notable for the following components:       Result Value    RBC 3.95 (*)     Hemoglobin 11.9 (*)     Hematocrit 35.2 (*)     Lymphocytes Absolute 0.6 (*)     All other components within normal limits   COMPREHENSIVE METABOLIC PANEL W/ REFLEX TO MG FOR LOW K - Abnormal; Notable for the following components:    Sodium 133 (*)     Glucose 108 (*)     Creatinine <0.5 (*)     All other components within normal limits   URINALYSIS WITH REFLEX TO CULTURE - Abnormal; Notable for the following components:    Protein, UA TRACE (*)     Leukocyte Esterase, Urine TRACE (*)     All other components within normal limits   MICROSCOPIC URINALYSIS - Abnormal; Notable for the following components:    Mucus, UA 2+ (*)     Epithelial Cells, UA 6-10 (*)     All other components within normal limits   COVID-19 & INFLUENZA COMBO   LIPASE   HCG, SERUM, QUALITATIVE   HCG, QUANTITATIVE, PREGNANCY       When ordered only abnormal lab results are displayed. All other labs were within normal range or not returned as of this dictation.    EKG: When ordered, EKG's are interpreted by the Emergency Department

## (undated) DEVICE — AIRLIFE™ NASAL OXYGEN CANNULA CURVED, FLARED TIP, WITH 7 FEET (2.1 M) CRUSH RESISTANT TUBING, OVER-THE-EAR STYLE: Brand: AIRLIFE™

## (undated) DEVICE — CA310 DIAGNOSTIC TAB ELECTRODE ( 100/PK) - ROUNDED CORNER: Brand: KENDALL

## (undated) DEVICE — ENDO CARRY-ON PROCEDURE KIT INCLUDES SUCTION TUBING, LUBRICANT, GAUZE, BIOHAZARD STICKER, TRANSPORT PAD AND INTERCEPT BEDSIDE KIT.: Brand: ENDO CARRY-ON PROCEDURE KIT

## (undated) DEVICE — FEMALE LUER ADAPTER: Brand: ARGYLE

## (undated) DEVICE — SYRINGE, LUER LOCK, 60ML: Brand: MEDLINE